# Patient Record
Sex: MALE | Race: WHITE | NOT HISPANIC OR LATINO | Employment: OTHER | URBAN - METROPOLITAN AREA
[De-identification: names, ages, dates, MRNs, and addresses within clinical notes are randomized per-mention and may not be internally consistent; named-entity substitution may affect disease eponyms.]

---

## 2020-06-23 ENCOUNTER — OFFICE VISIT (OUTPATIENT)
Dept: NEUROSURGERY | Facility: CLINIC | Age: 65
End: 2020-06-23

## 2020-06-23 VITALS
SYSTOLIC BLOOD PRESSURE: 84 MMHG | DIASTOLIC BLOOD PRESSURE: 66 MMHG | HEIGHT: 65 IN | TEMPERATURE: 96.9 F | WEIGHT: 141 LBS | BODY MASS INDEX: 23.49 KG/M2

## 2020-06-23 DIAGNOSIS — R26.9 GAIT DISTURBANCE: ICD-10-CM

## 2020-06-23 DIAGNOSIS — G91.2 NPH (NORMAL PRESSURE HYDROCEPHALUS) (HCC): Primary | ICD-10-CM

## 2020-06-23 PROCEDURE — 99203 OFFICE O/P NEW LOW 30 MIN: CPT | Performed by: NEUROLOGICAL SURGERY

## 2020-06-23 RX ORDER — PRAVASTATIN SODIUM 10 MG
TABLET ORAL
COMMUNITY
Start: 2020-05-01

## 2020-06-23 RX ORDER — QUETIAPINE FUMARATE 100 MG/1
TABLET, FILM COATED ORAL
COMMUNITY
Start: 2020-05-01

## 2020-06-23 RX ORDER — DONEPEZIL HYDROCHLORIDE 5 MG/1
TABLET, FILM COATED ORAL
COMMUNITY
Start: 2020-05-01

## 2020-06-23 RX ORDER — LEVOTHYROXINE SODIUM 0.03 MG/1
TABLET ORAL
COMMUNITY
Start: 2020-05-01

## 2020-06-23 RX ORDER — SERTRALINE HYDROCHLORIDE 100 MG/1
TABLET, FILM COATED ORAL
COMMUNITY
Start: 2020-05-01

## 2020-06-23 NOTE — PROGRESS NOTES
"Corey Mayorga  1955  [id number]      Chief Complaint   Patient presents with   • Head Injury       HISTORY OF PRESENT ILLNESS: This is a 65-year-old male who is presented very little history and medical records.  He apparently was involved in an accident requiring a right craniotomy for subdural hematoma several years ago.  The details, consequences and long-term sequelae after this are not recorded.  In the course of his evaluation he was found to have a lymph node gaited in the right side of the neck which turned out to be poorly differentiated adenocarcinoma, HPV strongly positive.  CT scan was performed and showed ventriculomegaly and he is referred for neurosurgical consultation.  He apparently has had cognitive dysfunction however the length of time that this is been present remains problematic.  It is my impression that this resulted after his accident in which required craniotomy.  Additionally he has intermittent issues with impotence.  His gait remains unsteady as it has for some time.  Therefore, he was referred with a question of normal pressure hydrocephalus.    Past Medical History:   Diagnosis Date   • Diabetes mellitus (CMS/[facility])    • TBI (traumatic brain injury) (CMS/[facility])        History reviewed. No pertinent surgical history.    History reviewed. No pertinent family history.    Social History     Socioeconomic History   • Marital status:      Spouse name: Not on file   • Number of children: Not on file   • Years of education: Not on file   • Highest education level: Not on file   Tobacco Use   • Smoking status: Current Every Day Smoker     Packs/day: 1.00     Types: Cigarettes       Allergies   Allergen Reactions   • Penicillins Other (See Comments)     \"Heat on arms\"         Current Outpatient Medications:   •  donepezil (ARICEPT) 5 MG tablet, , Disp: , Rfl:   •  levothyroxine (SYNTHROID, LEVOTHROID) 25 MCG tablet, , Disp: , Rfl:   •  metFORMIN (GLUCOPHAGE) 500 MG tablet, , Disp: , Rfl: "   •  pravastatin (PRAVACHOL) 10 MG tablet, , Disp: , Rfl:   •  QUEtiapine (SEROquel) 100 MG tablet, , Disp: , Rfl:   •  sertraline (ZOLOFT) 100 MG tablet, , Disp: , Rfl:   •  sertraline (ZOLOFT) 50 MG tablet, , Disp: , Rfl:     Review of Systems   Constitutional: Positive for appetite change, fatigue and unexpected weight change. Negative for activity change, chills, diaphoresis and fever.   HENT: Negative for congestion, dental problem, drooling, ear discharge, ear pain, facial swelling, hearing loss, mouth sores, nosebleeds, postnasal drip, rhinorrhea, sinus pressure, sneezing, sore throat, tinnitus, trouble swallowing and voice change.    Eyes: Negative for photophobia, pain, discharge, redness, itching and visual disturbance.   Respiratory: Negative for apnea, cough, choking, chest tightness, shortness of breath, wheezing and stridor.    Cardiovascular: Positive for leg swelling. Negative for chest pain and palpitations.   Gastrointestinal: Negative for abdominal distention, abdominal pain, anal bleeding, blood in stool, constipation, diarrhea, nausea, rectal pain and vomiting.   Musculoskeletal: Positive for myalgias and neck pain. Negative for arthralgias, back pain, gait problem, joint swelling and neck stiffness.   Skin: Negative for color change, pallor, rash and wound.   Allergic/Immunologic: Negative for environmental allergies, food allergies and immunocompromised state.   Neurological: Positive for dizziness, light-headedness, numbness and headaches. Negative for tremors, seizures, syncope, facial asymmetry, speech difficulty and weakness.   Hematological: Negative for adenopathy. Does not bruise/bleed easily.   Psychiatric/Behavioral: Positive for dysphoric mood. Negative for agitation, behavioral problems, confusion, decreased concentration, self-injury, sleep disturbance and suicidal ideas. The patient is not nervous/anxious and is not hyperactive.         Memory loss       Vitals:    06/23/20 1311  "  BP: (!) 84/66   Temp: 96.9 °F (36.1 °C)   Weight: 64 kg (141 lb)   Height: 165.1 cm (65\")       Neurological Examination:    Mental status/speech: The patient is alert and oriented.  Speech is clear without aphysia or dysarthria.  No overt cognitive deficits.    Cranial nerve examination:    Olfaction: Smell is intact.  Vision: Vision is intact without visual field abnormalities.  Funduscopic examination is normal.  No pupillary irregularity.  Ocular motor examination: The extraocular muscles are intact.  There is no diplopia.  The pupil is round and reactive to both light and accommodation.  There is no nystagmus.  Facial movement/sensation: There is no facial weakness.  Sensation is intact in the first, second, and third divisions of the trigeminal nerve.  The corneal reflex is intact.  Auditory: Hearing is intact to finger rub bilaterally.  Cranial nerves IX, X, XI, XII: Phonation is normal.  No dysphagia.  Tongue is protruded in the midline without atrophy.  The gag reflex is intact.  Shoulder shrug is normal.    Musculoligamentous ligamentous examination: His gait is slightly wide-based.  There is no weakness, sensory loss or reflex asymmetry.  No ataxia or dysmetria.    Medical Decision Making:     Diagnostic Data Set: No data set accompanies the patient      Assessment: Ventriculomegaly by history          Recommendations: Obviously, I need to review the diagnostic studies.  I have seen only a sparse amount of paperwork.  I have ordered an MRI of the brain and will call them subsequently.  If he has ventriculomegaly that cannot be ascribed to either atrophy or previous craniotomy for traumatic event I would recommend a large-volume lumbar puncture and reassessment thereafter.  I would be hesitant to recommend CSF diversion without review of all of his medical records including prognosis for his primary disease.  We have scheduled this to be done in [place].  He will call me afterward; I will review and if " lumbar puncture is required we will do this in [place].        I greatly appreciate the opportunity to see and evaluate this individual.  If you have questions or concerns regarding issues that I may have overlooked please call me at any time: [phone].  Milton Martínez M.D.  [facility]  [address] [place] [address]    Scribed for Federico Martínez MD by Ashwin Bennett CMA. 6/23/2020 13:43

## 2020-06-23 NOTE — PATIENT INSTRUCTIONS
After having MRI:     Call Dr. Martínez on a Monday or Tuesday and leave a message.     He will call you back at the end of the day as soon as he can.     [phone] [facility]   [phone] - [place]  [phone] - [place]

## 2020-07-01 ENCOUNTER — HOSPITAL ENCOUNTER (OUTPATIENT)
Dept: MRI IMAGING | Facility: HOSPITAL | Age: 65
Discharge: HOME | End: 2020-07-01

## 2020-07-01 DIAGNOSIS — G91.2 NPH (NORMAL PRESSURE HYDROCEPHALUS) (HCC): ICD-10-CM

## 2020-07-01 DIAGNOSIS — R26.9 GAIT DISTURBANCE: ICD-10-CM
